# Patient Record
Sex: FEMALE | URBAN - METROPOLITAN AREA
[De-identification: names, ages, dates, MRNs, and addresses within clinical notes are randomized per-mention and may not be internally consistent; named-entity substitution may affect disease eponyms.]

---
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
## 2023-11-13 NOTE — PROCEDURE: REASSURANCE
Hide Include Location In Plan Question?: No
Detail Level: Simple
Comment: Including lesion of concern. Stable per photo.
Render Risk Assessment In Note?: no
Additional Note: Will discuss removal if itching persists. Will use triamcinolone for itch.
Detail Level: Zone